# Patient Record
Sex: FEMALE | Race: BLACK OR AFRICAN AMERICAN | NOT HISPANIC OR LATINO | Employment: FULL TIME | URBAN - METROPOLITAN AREA
[De-identification: names, ages, dates, MRNs, and addresses within clinical notes are randomized per-mention and may not be internally consistent; named-entity substitution may affect disease eponyms.]

---

## 2023-09-29 ENCOUNTER — HOSPITAL ENCOUNTER (OUTPATIENT)
Dept: DATA CONVERSION | Facility: HOSPITAL | Age: 18
Discharge: HOME | End: 2023-09-30
Attending: OBSTETRICS & GYNECOLOGY | Admitting: OBSTETRICS & GYNECOLOGY
Payer: MEDICAID

## 2023-09-29 PROCEDURE — 83605 ASSAY OF LACTIC ACID: CPT

## 2023-09-29 PROCEDURE — 80053 COMPREHEN METABOLIC PANEL: CPT

## 2023-09-29 PROCEDURE — 85025 COMPLETE CBC W/AUTO DIFF WBC: CPT

## 2023-09-29 PROCEDURE — 83880 ASSAY OF NATRIURETIC PEPTIDE: CPT

## 2023-09-29 PROCEDURE — 93005 ELECTROCARDIOGRAM TRACING: CPT

## 2023-09-29 PROCEDURE — 9990 CHARGE CONVERSION

## 2023-09-30 VITALS — HEART RATE: 97 BPM | OXYGEN SATURATION: 100 % | SYSTOLIC BLOOD PRESSURE: 119 MMHG | DIASTOLIC BLOOD PRESSURE: 70 MMHG

## 2023-09-30 LAB
ALANINE AMINOTRANSFERASE (SGPT) (U/L) IN SER/PLAS: 13 U/L (ref 7–45)
ALBUMIN (G/DL) IN SER/PLAS: 4.3 G/DL (ref 3.4–5)
ALKALINE PHOSPHATASE (U/L) IN SER/PLAS: 84 U/L (ref 33–110)
ANION GAP IN SER/PLAS: 13 MMOL/L (ref 10–20)
ASPARTATE AMINOTRANSFERASE (SGOT) (U/L) IN SER/PLAS: 11 U/L (ref 9–39)
BASOPHILS (10*3/UL) IN BLOOD BY AUTOMATED COUNT: 0.02 X10E9/L (ref 0–0.1)
BASOPHILS/100 LEUKOCYTES IN BLOOD BY AUTOMATED COUNT: 0.3 % (ref 0–2)
BILIRUBIN TOTAL (MG/DL) IN SER/PLAS: 0.2 MG/DL (ref 0–1.2)
CALCIUM (MG/DL) IN SER/PLAS: 9.9 MG/DL (ref 8.6–10.6)
CARBON DIOXIDE, TOTAL (MMOL/L) IN SER/PLAS: 25 MMOL/L (ref 21–32)
CHLORIDE (MMOL/L) IN SER/PLAS: 107 MMOL/L (ref 98–107)
CREATININE (MG/DL) IN SER/PLAS: 0.67 MG/DL (ref 0.5–1.05)
EOSINOPHILS (10*3/UL) IN BLOOD BY AUTOMATED COUNT: 0.16 X10E9/L (ref 0–0.7)
EOSINOPHILS/100 LEUKOCYTES IN BLOOD BY AUTOMATED COUNT: 2.8 % (ref 0–6)
ERYTHROCYTE DISTRIBUTION WIDTH (RATIO) BY AUTOMATED COUNT: 17 % (ref 11.5–14.5)
ERYTHROCYTE MEAN CORPUSCULAR HEMOGLOBIN CONCENTRATION (G/DL) BY AUTOMATED: 31.3 G/DL (ref 32–36)
ERYTHROCYTE MEAN CORPUSCULAR VOLUME (FL) BY AUTOMATED COUNT: 90 FL (ref 80–100)
ERYTHROCYTES (10*6/UL) IN BLOOD BY AUTOMATED COUNT: 4.11 X10E12/L (ref 4–5.2)
GFR FEMALE: >90 ML/MIN/1.73M2
GLUCOSE (MG/DL) IN SER/PLAS: 69 MG/DL (ref 74–99)
HEMATOCRIT (%) IN BLOOD BY AUTOMATED COUNT: 36.8 % (ref 36–46)
HEMOGLOBIN (G/DL) IN BLOOD: 11.5 G/DL (ref 12–16)
IMMATURE GRANULOCYTES/100 LEUKOCYTES IN BLOOD BY AUTOMATED COUNT: 0.3 % (ref 0–0.9)
LACTATE (MMOL/L) IN SER/PLAS: 1 MMOL/L (ref 0.4–2)
LEUKOCYTES (10*3/UL) IN BLOOD BY AUTOMATED COUNT: 5.7 X10E9/L (ref 4.4–11.3)
LYMPHOCYTES (10*3/UL) IN BLOOD BY AUTOMATED COUNT: 2.05 X10E9/L (ref 1.2–4.8)
LYMPHOCYTES/100 LEUKOCYTES IN BLOOD BY AUTOMATED COUNT: 35.7 % (ref 13–44)
MONOCYTES (10*3/UL) IN BLOOD BY AUTOMATED COUNT: 0.44 X10E9/L (ref 0.1–1)
MONOCYTES/100 LEUKOCYTES IN BLOOD BY AUTOMATED COUNT: 7.7 % (ref 2–10)
NATRIURETIC PEPTIDE B (PG/ML) IN SER/PLAS: <2 PG/ML (ref 0–99)
NEUTROPHILS (10*3/UL) IN BLOOD BY AUTOMATED COUNT: 3.05 X10E9/L (ref 1.2–7.7)
NEUTROPHILS/100 LEUKOCYTES IN BLOOD BY AUTOMATED COUNT: 53.2 % (ref 40–80)
NRBC (PER 100 WBCS) BY AUTOMATED COUNT: 0 /100 WBC (ref 0–0)
PLATELETS (10*3/UL) IN BLOOD AUTOMATED COUNT: 357 X10E9/L (ref 150–450)
POTASSIUM (MMOL/L) IN SER/PLAS: 4 MMOL/L (ref 3.5–5.3)
PROTEIN TOTAL: 6.8 G/DL (ref 6.4–8.2)
SODIUM (MMOL/L) IN SER/PLAS: 141 MMOL/L (ref 136–145)
UREA NITROGEN (MG/DL) IN SER/PLAS: 9 MG/DL (ref 6–23)

## 2023-09-30 PROCEDURE — 9990 CHARGE CONVERSION

## 2023-09-30 PROCEDURE — 99213 OFFICE O/P EST LOW 20 MIN: CPT | Performed by: OBSTETRICS & GYNECOLOGY

## 2023-09-30 RX ORDER — ACETAMINOPHEN 325 MG/1
975 TABLET ORAL ONCE
Status: DISCONTINUED | OUTPATIENT
Start: 2023-09-30 | End: 2023-09-30 | Stop reason: HOSPADM

## 2023-09-30 RX ORDER — CYCLOBENZAPRINE HCL 10 MG
10 TABLET ORAL ONCE
Status: DISCONTINUED | OUTPATIENT
Start: 2023-09-30 | End: 2023-09-30 | Stop reason: HOSPADM

## 2023-09-30 ASSESSMENT — ENCOUNTER SYMPTOMS: CHEST TIGHTNESS: 1

## 2023-09-30 NOTE — H&P
"History Of Present Illness  Yvonne Pepper is a 19yo  s/p VAVD complicated by 2nd degree laceration on  (PPD#29) who is presenting for chest pain and pelvic pressure.    Patient states onset of episodic chest pain beginning at 9am this morning then again at 9pm, both lasting for approx. 5-6 min. She had notably taken Keflex for a UTI 3-4h prior to each episode. She has tried increasing her water intake while taking Keflex, but has not noticed improvement in sx. She states she has been having ongoing CP after taking Keflex, but today's episodes \"felt different.\" She reports associated nausea but denies SOB or palpitations.     She also reports pelvic pressure since her delivery on . She describes it as a \"heaviness\" and states that it feels like something may be \"coming out,\" especially when standing. She locates the pressure mainly near her perineal and clitoral regions, where she had stitches following delivery. She denies seeing or feeling a bulge. She reports ongoing lochia that is intermittently heavy with associated passage of occasional clots. She was last seen at [facility] earlier this week for this concern.      Past Medical History  She has no past medical history on file.    Surgical History  She has no past surgical history on file.     Social History  She has no history on file for tobacco use, alcohol use, and drug use.    Family History  No family history on file.     Allergies  Patient has no allergy information on record.    Review of Systems   Respiratory:  Positive for chest tightness.    Genitourinary:  Positive for vaginal bleeding and vaginal pain.   All other systems reviewed and are negative.       Physical Exam  Vitals and nursing note reviewed.   Abdominal:      Palpations: Abdomen is soft.      Comments: Fundus firm, non-tender to palpation   Genitourinary:     General: Normal vulva.      Vagina: No vaginal discharge.      Comments: Incision intact and well-approximated with sutures " visible to inspection. No e/o hematoma or prolapse.  Skin:     General: Skin is warm and dry.   Neurological:      Mental Status: She is alert.     CV: RRR- no murmurs  Thorax- pain with palpation of chest wall     Last Recorded Vitals  Blood pressure 119/70, pulse 97, SpO2 100 %.    Relevant Results           Assessment/Plan   Active Problems:  There are no active Hospital Problems.    Yvonne is a 17yo  s/p VAVD complicated by 2nd degree laceration on  (PPD#29) who is presenting for chest pain and pelvic pressure    Chest tightness  EKG with sinus sea, nonspecific T wave abnormality  BNP, CBC, CMP, and lactate wnl   Udip with 2+ Leuks likely due to lochia  Sx noted to improve in triage following BMX  Suspect dyspepsia secondary to Keflex  Given that she is asymptomatic for sx of UTI- chart review from [facility]- UA only with leukocytes and not UC low suspicion for a UTI instructed to d/c Keflex.     Pelvic Pressure  BSUS wnl with thin uterine stripe   Vulvovaginal exam with intact sutures. No e/o hematoma formation or prolapse with Valsalva   Suspect MSK etiology given reassuring exam findings and improvement with Flexeril     Dispo: d/c home in stable condition with return precautions. Patient agreeable with plan.    Pt. Seen and dw Dr. Graham CHA. Lucy Adamson MD  PGY-1, Obstetrics & Gynecology   [facility]                Carlee Adamson MD

## 2023-10-03 PROCEDURE — 83605 ASSAY OF LACTIC ACID: CPT

## 2023-10-03 PROCEDURE — 83880 ASSAY OF NATRIURETIC PEPTIDE: CPT

## 2023-10-03 PROCEDURE — 85025 COMPLETE CBC W/AUTO DIFF WBC: CPT

## 2023-10-03 PROCEDURE — 9990 CHARGE CONVERSION

## 2023-10-03 PROCEDURE — 80053 COMPREHEN METABOLIC PANEL: CPT

## 2023-10-09 PROCEDURE — 9990 CHARGE CONVERSION

## 2023-10-09 PROCEDURE — 93005 ELECTROCARDIOGRAM TRACING: CPT

## 2023-10-11 PROCEDURE — 9990 CHARGE CONVERSION

## 2023-10-16 LAB
ATRIAL RATE: 56 BPM
P AXIS: 55 DEGREES
P OFFSET: 205 MS
P ONSET: 155 MS
PR INTERVAL: 130 MS
Q ONSET: 220 MS
QRS COUNT: 9 BEATS
QRS DURATION: 80 MS
QT INTERVAL: 380 MS
QTC CALCULATION(BAZETT): 366 MS
QTC FREDERICIA: 371 MS
R AXIS: 32 DEGREES
T AXIS: 40 DEGREES
T OFFSET: 410 MS
VENTRICULAR RATE: 56 BPM

## 2024-05-04 ENCOUNTER — HOSPITAL ENCOUNTER (EMERGENCY)
Facility: HOSPITAL | Age: 19
Discharge: HOME | End: 2024-05-05
Attending: EMERGENCY MEDICINE
Payer: MEDICAID

## 2024-05-04 VITALS
HEART RATE: 70 BPM | HEIGHT: 62 IN | TEMPERATURE: 97.6 F | BODY MASS INDEX: 31.28 KG/M2 | SYSTOLIC BLOOD PRESSURE: 104 MMHG | DIASTOLIC BLOOD PRESSURE: 66 MMHG | WEIGHT: 170 LBS | OXYGEN SATURATION: 100 % | RESPIRATION RATE: 18 BRPM

## 2024-05-04 DIAGNOSIS — R82.71 ASYMPTOMATIC BACTERIURIA: ICD-10-CM

## 2024-05-04 DIAGNOSIS — Z34.90 PREGNANCY, UNSPECIFIED GESTATIONAL AGE (HHS-HCC): ICD-10-CM

## 2024-05-04 DIAGNOSIS — R10.84 GENERALIZED ABDOMINAL PAIN: Primary | ICD-10-CM

## 2024-05-04 PROCEDURE — 99284 EMERGENCY DEPT VISIT MOD MDM: CPT | Mod: 25 | Performed by: EMERGENCY MEDICINE

## 2024-05-04 PROCEDURE — 99284 EMERGENCY DEPT VISIT MOD MDM: CPT | Performed by: EMERGENCY MEDICINE

## 2024-05-04 ASSESSMENT — COLUMBIA-SUICIDE SEVERITY RATING SCALE - C-SSRS
2. HAVE YOU ACTUALLY HAD ANY THOUGHTS OF KILLING YOURSELF?: NO
1. IN THE PAST MONTH, HAVE YOU WISHED YOU WERE DEAD OR WISHED YOU COULD GO TO SLEEP AND NOT WAKE UP?: NO
6. HAVE YOU EVER DONE ANYTHING, STARTED TO DO ANYTHING, OR PREPARED TO DO ANYTHING TO END YOUR LIFE?: NO

## 2024-05-05 ENCOUNTER — APPOINTMENT (OUTPATIENT)
Dept: RADIOLOGY | Facility: HOSPITAL | Age: 19
End: 2024-05-05
Payer: MEDICAID

## 2024-05-05 LAB
APPEARANCE UR: CLEAR
B-HCG SERPL-ACNC: NORMAL MIU/ML
BILIRUB UR STRIP.AUTO-MCNC: NEGATIVE MG/DL
COLOR UR: ABNORMAL
GLUCOSE UR STRIP.AUTO-MCNC: NORMAL MG/DL
HOLD SPECIMEN: NORMAL
KETONES UR STRIP.AUTO-MCNC: NEGATIVE MG/DL
LEUKOCYTE ESTERASE UR QL STRIP.AUTO: ABNORMAL
NITRITE UR QL STRIP.AUTO: NEGATIVE
PH UR STRIP.AUTO: 6.5 [PH]
PROT UR STRIP.AUTO-MCNC: ABNORMAL MG/DL
RBC # UR STRIP.AUTO: ABNORMAL /UL
RBC #/AREA URNS AUTO: ABNORMAL /HPF
SP GR UR STRIP.AUTO: 1.03
SQUAMOUS #/AREA URNS AUTO: ABNORMAL /HPF
UROBILINOGEN UR STRIP.AUTO-MCNC: NORMAL MG/DL
WBC #/AREA URNS AUTO: ABNORMAL /HPF

## 2024-05-05 PROCEDURE — 76817 TRANSVAGINAL US OBSTETRIC: CPT | Performed by: STUDENT IN AN ORGANIZED HEALTH CARE EDUCATION/TRAINING PROGRAM

## 2024-05-05 PROCEDURE — 81001 URINALYSIS AUTO W/SCOPE: CPT

## 2024-05-05 PROCEDURE — 76801 OB US < 14 WKS SINGLE FETUS: CPT | Performed by: STUDENT IN AN ORGANIZED HEALTH CARE EDUCATION/TRAINING PROGRAM

## 2024-05-05 PROCEDURE — 84702 CHORIONIC GONADOTROPIN TEST: CPT

## 2024-05-05 PROCEDURE — 36415 COLL VENOUS BLD VENIPUNCTURE: CPT

## 2024-05-05 PROCEDURE — 76801 OB US < 14 WKS SINGLE FETUS: CPT

## 2024-05-05 RX ORDER — CEPHALEXIN 500 MG/1
500 CAPSULE ORAL 4 TIMES DAILY
Qty: 28 CAPSULE | Refills: 0 | Status: SHIPPED | OUTPATIENT
Start: 2024-05-05 | End: 2024-05-12

## 2024-05-05 RX ORDER — PANTOPRAZOLE SODIUM 20 MG/1
20 TABLET, DELAYED RELEASE ORAL ONCE
Status: DISCONTINUED | OUTPATIENT
Start: 2024-05-05 | End: 2024-05-05 | Stop reason: HOSPADM

## 2024-05-05 NOTE — ED PROVIDER NOTES
HPI   Chief Complaint   Patient presents with    Abdominal Pain       HPI  Patient is a 19-year-old female with no significant past medical history presenting for evaluation of her pregnancy. Patient reports that on 2024, she tested positive for pregnancy.  Patient reports that she plans to have an .  Patient says she has an OB appointment on May 28 and she plans on telling the physician during that time that she wants an .  Patient wants to find out how far along she is and that is the main reason she is here. In contrast to the triage note which reports abdominal pain, the patient denies any actual abdominal pain. She reports that she is just here to evaluate her pregnancy. She does report some urinary frequency but no pain with urination. She denies any vaginal discharge or vaginal bleeding. She denies any concern for any STD and declines testing for this today. Patient's last menstrual cycle was 6 weeks ago. No headache, dizziness, vision changes, neck pain, back pain, chest pain, shortness of breath, nausea, vomiting, diarrhea, constipation,numbness, tingling, fevers, or chills. No trauma, falls, or injuries.                    Lissette Coma Scale Score: 15                     Patient History   No past medical history on file.  No past surgical history on file.  No family history on file.  Social History     Tobacco Use    Smoking status: Not on file    Smokeless tobacco: Not on file   Substance Use Topics    Alcohol use: Not on file    Drug use: Not on file       Physical Exam   ED Triage Vitals [24 2347]   Temperature Heart Rate Respirations BP   36.4 °C (97.6 °F) 70 18 104/66      Pulse Ox Temp Source Heart Rate Source Patient Position   100 % Tympanic -- --      BP Location FiO2 (%)     -- --       Physical Exam  Constitutional:       General: She is not in acute distress.     Appearance: She is well-developed.      Comments: Seen eating chips comfortably.   HENT:      Head:  Normocephalic and atraumatic.   Eyes:      General: No scleral icterus.     Extraocular Movements: Extraocular movements intact.   Cardiovascular:      Rate and Rhythm: Normal rate and regular rhythm.   Pulmonary:      Effort: Pulmonary effort is normal. No respiratory distress.      Breath sounds: Normal breath sounds. No wheezing.   Abdominal:      General: Bowel sounds are normal. There is no distension.      Palpations: Abdomen is soft.      Tenderness: There is no abdominal tenderness. There is no guarding or rebound. Negative signs include Preciado's sign and McBurney's sign.   Genitourinary:     Comments: Patient declined pelvic exam.  Skin:     General: Skin is warm.      Capillary Refill: Capillary refill takes 2 to 3 seconds.      Findings: No rash.   Neurological:      General: No focal deficit present.      Mental Status: She is alert.      Motor: No weakness.   Psychiatric:         Mood and Affect: Mood normal.         ED Course & MDM   ED Course as of 05/05/24 1559   Sun May 05, 2024   0329 US wet read: single live IUP, UA with leukocyte esterase, WBC, will treat as UTI given pregnancy [SA]      ED Course User Index  [SA] Lou Abrahamedgarlisa,          Diagnoses as of 05/05/24 1559   Generalized abdominal pain   Pregnancy, unspecified gestational age ([facility])   Asymptomatic bacteriuria       Medical Decision Making  Patient is a 19-year-old female with no significant medical history presenting for evaluation of her pregnancy with recent positive pregnancy test on April 27.  Patient reports that she is here because she wants to find out how far along she is.  Patient has not received an ultrasound yet during this pregnancy so a formal pelvic ultrasound was ordered for evaluation. Patient denies any vaginal discharge or vaginal bleeding. She declined a pelvic exam and denies any concern for any STDs.  A beta hCG and urinalysis were also ordered.  At bedside, patient was hemodynamically stable and afebrile.   On physical exam the patient did not endorse any abdominal pain. Her abdomen is soft, nontender, non-peritonitic. She is seen resting and eating comfortably. Patient does report that she has an outpatient OB appointment on May 28.  During the time of signout, patient labs and pelvic ultrasound were still pending. Patient remains stable at this time.    Procedure  Procedures     David Marr MD  Resident  05/05/24 7904    I saw and evaluated the patient. I personally obtained the key and critical portions of the history and physical exam or was physically present for key and critical portions performed by the resident/fellow. I reviewed the resident/fellow's documentation and discussed the patient with the resident/fellow. I agree with the resident/fellow's medical decision making as documented in the note.    MD Rylee Martini MD  05/05/24 9018

## 2024-05-05 NOTE — PROGRESS NOTES
I received this patient during signout.  Please see previous provider's note for detailed H&P, labs and imaging.    Under my care, the patient was reassessed and remains clinically stable.  Patient is requesting to ;lindy  prior to final read of pelvic ultrasound and she states she will follow up the results on her MyChart.  I did message OB/GYN to provide outpatient resources for patient but they were unable to respond prior to patient wanting to leave.  I did provide OB/GYN referral to access additional resources.  Given patient does have a single live IUP of 6 weeks gestation on ultrasound and leukocyte esterase on UA I did provide prescription for Keflex for treatment of UTI. Patient remains stable, well appearing. She is tolerating po intake.    @  ED Course as of 05/05/24 0339   Sun May 05, 2024   0329  wet read: single live IUP, UA with leukocyte esterase, WBC, will treat as UTI given pregnancy [SA]      ED Course User Index  [SA] Lou Cordova DO         Diagnoses as of 05/05/24 0339   Generalized abdominal pain   Pregnancy, unspecified gestational age ([facility])   Asymptomatic bacteriuria       Disposition:     Discharged in stable condition. Patient will follow-up with OB/GYN and primary care physician. Return if worse. They understand return precautions and discharge instructions. Patient and family/friend/caregiver are in agreement with this plan.     Patient seen and staffed with attending physician.     Lou Cordova DO   EM PGY2

## 2024-05-05 NOTE — DISCHARGE INSTRUCTIONS
You have a urinary tract infections for which I prescribed antibiotics.  Provided OB/GYN referral and their phone number.    .--   [facility]  Phone: [phone]  Address: [facility] [place] [address]

## 2024-05-05 NOTE — ED TRIAGE NOTES
PT to the ED for abdominal pain, pt had a positive preg test 4/27th. PT endorsing urinary symptoms.